# Patient Record
Sex: MALE | Race: OTHER | Employment: FULL TIME | URBAN - METROPOLITAN AREA
[De-identification: names, ages, dates, MRNs, and addresses within clinical notes are randomized per-mention and may not be internally consistent; named-entity substitution may affect disease eponyms.]

---

## 2017-09-14 ENCOUNTER — OFFICE VISIT (OUTPATIENT)
Dept: FAMILY MEDICINE CLINIC | Age: 32
End: 2017-09-14

## 2017-09-14 VITALS
WEIGHT: 168 LBS | RESPIRATION RATE: 16 BRPM | DIASTOLIC BLOOD PRESSURE: 76 MMHG | BODY MASS INDEX: 24.05 KG/M2 | HEIGHT: 70 IN | TEMPERATURE: 98.9 F | HEART RATE: 74 BPM | OXYGEN SATURATION: 99 % | SYSTOLIC BLOOD PRESSURE: 113 MMHG

## 2017-09-14 DIAGNOSIS — L24.7 IRRITANT CONTACT DERMATITIS DUE TO PLANTS, EXCEPT FOOD: ICD-10-CM

## 2017-09-14 DIAGNOSIS — L30.9 DERMATITIS: Primary | ICD-10-CM

## 2017-09-14 RX ORDER — METHYLPREDNISOLONE 4 MG/1
TABLET ORAL
Qty: 1 DOSE PACK | Refills: 0 | Status: SHIPPED | OUTPATIENT
Start: 2017-09-14

## 2017-09-14 NOTE — PROGRESS NOTES
Dominick Shaver is an 33 y.o. male who presents for   Chief Complaint   Patient presents with    Rash     Reports painful rash in hands and legs for the past week -- working in the yard, clearing leaves, saw many ants and thinks he was bitten. Rash started on legs, then started on arms. Itchy. No fever. Has tried cortisone 10 with no improvement. Allergies - reviewed:   No Known Allergies      Medications - reviewed:   Current Outpatient Prescriptions   Medication Sig    sertraline (ZOLOFT) 25 mg tablet Take 1 Tab by mouth daily.  ALPRAZolam (XANAX) 0.25 mg tablet Take 1 Tab by mouth every eight (8) hours as needed for Anxiety. Max Daily Amount: 0.75 mg. No current facility-administered medications for this visit. Past Medical History - reviewed:  Past Medical History:   Diagnosis Date    Anxiety          Past Surgical History - reviewed:   History reviewed. No pertinent surgical history. Social History - reviewed:  Social History     Social History    Marital status:      Spouse name: N/A    Number of children: N/A    Years of education: N/A     Occupational History    Not on file. Social History Main Topics    Smoking status: Never Smoker    Smokeless tobacco: Never Used    Alcohol use No    Drug use: No    Sexual activity: Yes     Partners: Female     Other Topics Concern    Not on file     Social History Narrative         Family History - reviewed:  History reviewed. No pertinent family history. Immunizations - reviewed: There is no immunization history on file for this patient.       ROS  CONSTITUTIONAL: Denies: fever  SKIN: itching      Physical Exam  Visit Vitals    /76 (BP 1 Location: Left arm, BP Patient Position: Sitting)    Pulse 74    Temp 98.9 °F (37.2 °C) (Oral)    Resp 16    Ht 5' 9.69\" (1.77 m)    Wt 168 lb (76.2 kg)    SpO2 99%    BMI 24.32 kg/m2       General appearance - alert, well appearing, and in no distress  Eyes - EOMI  Mouth - mucous membranes moist, pharynx normal without lesions  Neck - supple, no significant adenopathy  Chest - clear to auscultation, no wheezes, rales or rhonchi, symmetric air entry  Heart - normal rate, regular rhythm, normal S1, S2, no murmurs, rubs, clicks or gallops  Neurological - alert, oriented, normal speech, no focal findings or movement disorder noted  Musculoskeletal - no joint tenderness, deformity or swelling  Extremities - peripheral pulses normal, no pedal edema, no clubbing or cyanosis  Skin -     Fine erythematous vesicular rash over BLE, evidence of excoriation. L hand above, firm vesicle between 2nd and 3rd digits. Assessment/Plan    ICD-10-CM ICD-9-CM    1. Dermatitis L30.9 692.9 methylPREDNISolone (MEDROL DOSEPACK) 4 mg tablet   2. Irritant contact dermatitis due to plants, except food L24.7 692.6 methylPREDNISolone (MEDROL DOSEPACK) 4 mg tablet       Findings concerning for irritant contact dermatitis. Given recent hx of working in yard, development of symptoms thereafter. Advised that he remove and clean all clothing from that episode and will also give medrol dose pack given significant pruritus. Follow-up Disposition:  Return if symptoms worsen or fail to improve. I have discussed the diagnosis with the patient and the intended plan as seen in the above orders. The patient has received an after-visit summary and questions were answered concerning future plans. I have discussed medication side effects and warnings with the patient as well. Neftaly Rivas MD  Family Medicine Resident    Patient seen and discussed with Dr. Arben Stanton, attending physician.